# Patient Record
Sex: MALE | URBAN - METROPOLITAN AREA
[De-identification: names, ages, dates, MRNs, and addresses within clinical notes are randomized per-mention and may not be internally consistent; named-entity substitution may affect disease eponyms.]

---

## 2020-10-28 ENCOUNTER — OFFICE VISIT (OUTPATIENT)
Dept: URBAN - METROPOLITAN AREA CLINIC 7 | Facility: CLINIC | Age: 65
End: 2020-10-28
Payer: COMMERCIAL

## 2020-10-28 PROCEDURE — 67028 INJECTION EYE DRUG: CPT | Performed by: OPHTHALMOLOGY

## 2020-10-28 PROCEDURE — 92134 CPTRZ OPH DX IMG PST SGM RTA: CPT | Performed by: OPHTHALMOLOGY

## 2020-10-28 PROCEDURE — 92014 COMPRE OPH EXAM EST PT 1/>: CPT | Performed by: OPHTHALMOLOGY

## 2020-10-28 ASSESSMENT — INTRAOCULAR PRESSURE
OD: 13
OS: 17

## 2020-10-28 NOTE — IMPRESSION/PLAN
Impression: Central retinal vein occls, right eye, with macular edema: H34.8110.
-recurrence at 3 month interval

s/p Eylea OD: 7/29/20 OCT: 10/28/20 OD: tr IRF
OS: wnl Plan: Will proceed with Eylea OD today. Recurrence at extension to 12 weeks in past. Will try 3 months again. 

RTC 12 weeks reeval ou, oct ou, eylea OD

## 2021-01-20 ENCOUNTER — OFFICE VISIT (OUTPATIENT)
Dept: URBAN - METROPOLITAN AREA CLINIC 7 | Facility: CLINIC | Age: 66
End: 2021-01-20
Payer: COMMERCIAL

## 2021-01-20 PROCEDURE — 92134 CPTRZ OPH DX IMG PST SGM RTA: CPT | Performed by: OPHTHALMOLOGY

## 2021-01-20 PROCEDURE — 67028 INJECTION EYE DRUG: CPT | Performed by: OPHTHALMOLOGY

## 2021-01-20 PROCEDURE — 99214 OFFICE O/P EST MOD 30 MIN: CPT | Performed by: OPHTHALMOLOGY

## 2021-01-20 ASSESSMENT — INTRAOCULAR PRESSURE
OS: 17
OD: 10

## 2021-01-20 NOTE — IMPRESSION/PLAN
Impression: Age-related nuclear cataract, left eye: H25.12. Plan: Not yet visually significant.  Pt follows with Dr Carly Valle

## 2021-01-20 NOTE — IMPRESSION/PLAN
Impression: Central retinal vein occls, right eye, with macular edema: H34.8110.
-recurrence at 3 month interval

s/p Eylea OD: 10/28/20 OCT: 1/20/21 OD: tr IRF
OS: wnl Plan: The diagnosis, natural history, and prognosis of central retinal vein occlusion, were discussed. The associations with macular edema and neovascularization were also discussed. Currently, there is no iris or angle neovascularization identified on examination, and the IOP is within normal limits. Risks, benefits, and alternatives of treatment options including laser, steroids, Eylea, Lucentis and Avastin, as well as the opportunity to participate in a clinical trial were discussed at length. The patient understands that treatment may not improve vision, but should reduce the risk of further visual loss. Given stability of vision and exam, pt elects to proceed with Eylea OD today. Recurrence at extension to 12 weeks in past. Will try 3 months again. 

RTC 12 weeks reeval ou, oct ou, eylea OD

## 2021-04-21 ENCOUNTER — OFFICE VISIT (OUTPATIENT)
Dept: URBAN - METROPOLITAN AREA CLINIC 7 | Facility: CLINIC | Age: 66
End: 2021-04-21
Payer: COMMERCIAL

## 2021-04-21 DIAGNOSIS — H34.8110 CENTRAL RETINAL VEIN OCCLS, RIGHT EYE, WITH MACULAR EDEMA: Primary | ICD-10-CM

## 2021-04-21 DIAGNOSIS — H25.12 AGE-RELATED NUCLEAR CATARACT, LEFT EYE: ICD-10-CM

## 2021-04-21 PROCEDURE — 99214 OFFICE O/P EST MOD 30 MIN: CPT | Performed by: OPHTHALMOLOGY

## 2021-04-21 PROCEDURE — 67028 INJECTION EYE DRUG: CPT | Performed by: OPHTHALMOLOGY

## 2021-04-21 PROCEDURE — 92134 CPTRZ OPH DX IMG PST SGM RTA: CPT | Performed by: OPHTHALMOLOGY

## 2021-04-21 RX ORDER — TIMOLOL MALEATE 5 MG/ML
0.5 % SOLUTION/ DROPS OPHTHALMIC
Qty: 10 | Refills: 2 | Status: ACTIVE
Start: 2021-04-21

## 2021-04-21 ASSESSMENT — INTRAOCULAR PRESSURE
OS: 18
OD: 33

## 2021-04-21 NOTE — IMPRESSION/PLAN
Impression: Age-related nuclear cataract, left eye: H25.12. Plan: Not yet visually significant.  Pt follows with Dr Chandrakant Rod

## 2021-04-21 NOTE — IMPRESSION/PLAN
Impression: Central retinal vein occls, right eye, with macular edema: H34.8110.
-recurrence at 3 month interval

s/p Eylea OD: 01/20/21 OCT: 04/21/21 OD: tr IRF
OS: wnl Plan: The diagnosis, natural history, and prognosis of central retinal vein occlusion, were discussed. The associations with macular edema and neovascularization were also discussed. Currently, there is no iris or angle neovascularization identified on examination, and the IOP is within normal limits. Risks, benefits, and alternatives of treatment options including laser, steroids, Eylea, Lucentis and Avastin, as well as the opportunity to participate in a clinical trial were discussed at length. The patient understands that treatment may not improve vision, but should reduce the risk of further visual loss. Given stability of vision and exam, pt elects to proceed with Eylea OD today. Recurrence at extension to 12 weeks in past. Will try 3 months again. 

RTC 12 weeks reeval ou, oct ou, eylea OD

## 2021-07-14 ENCOUNTER — OFFICE VISIT (OUTPATIENT)
Dept: URBAN - METROPOLITAN AREA CLINIC 7 | Facility: CLINIC | Age: 66
End: 2021-07-14
Payer: COMMERCIAL

## 2021-07-14 DIAGNOSIS — H40.051 OCULAR HYPERTENSION, RIGHT EYE: ICD-10-CM

## 2021-07-14 PROCEDURE — 92134 CPTRZ OPH DX IMG PST SGM RTA: CPT | Performed by: OPHTHALMOLOGY

## 2021-07-14 PROCEDURE — 99213 OFFICE O/P EST LOW 20 MIN: CPT | Performed by: OPHTHALMOLOGY

## 2021-07-14 RX ORDER — LATANOPROST 50 UG/ML
0.005 % SOLUTION OPHTHALMIC
Qty: 5 | Refills: 3 | Status: INACTIVE
Start: 2021-07-14 | End: 2021-08-27

## 2021-07-14 ASSESSMENT — INTRAOCULAR PRESSURE
OD: 30
OS: 15

## 2021-07-14 NOTE — IMPRESSION/PLAN
Impression: Central retinal vein occls, right eye, with macular edema: H34.8110.
-recurrence at 3 month interval

s/p Eylea OD: 04/21/21 OCT: 07/14/21 OD: no ME
OS: wnl Plan: The diagnosis, natural history, and prognosis of central retinal vein occlusion, were discussed. The associations with macular edema and neovascularization were also discussed. Currently, there is no iris or angle neovascularization identified on examination, and the IOP is within normal limits. Risks, benefits, and alternatives of treatment options including laser, steroids, Eylea, Lucentis and Avastin, as well as the opportunity to participate in a clinical trial were discussed at length. The patient understands that treatment may not improve vision, but should reduce the risk of further visual loss. Given stability of vision and exam, pt elects to proceed with observe OD today. Recurrence at extension to 12 weeks in past. Will try 3 months again. 

RTC 4 weeks reeval ou, oct ou, eylea OD

## 2021-07-14 NOTE — IMPRESSION/PLAN
Impression: Age-related nuclear cataract, left eye: H25.12. Plan: Not yet visually significant.  Pt follows with Dr Amadeo Velazquez

## 2021-08-11 ENCOUNTER — OFFICE VISIT (OUTPATIENT)
Dept: URBAN - METROPOLITAN AREA CLINIC 7 | Facility: CLINIC | Age: 66
End: 2021-08-11
Payer: COMMERCIAL

## 2021-08-11 PROCEDURE — 99214 OFFICE O/P EST MOD 30 MIN: CPT | Performed by: OPHTHALMOLOGY

## 2021-08-11 PROCEDURE — 92134 CPTRZ OPH DX IMG PST SGM RTA: CPT | Performed by: OPHTHALMOLOGY

## 2021-08-11 ASSESSMENT — INTRAOCULAR PRESSURE
OS: 15
OD: 30

## 2021-08-11 NOTE — IMPRESSION/PLAN
Impression: Ocular hypertension, right eye: H40.051. Right. Plan: Pt having hard time tolerating drops. Will refer back to Dr Tavares Broussard for further management.

## 2021-08-11 NOTE — IMPRESSION/PLAN
Impression: Central retinal vein occls, right eye, with macular edema: H34.8110.
-previous recurrence at 3 month interval

s/p Eylea OD: 4/21/21 OCT: 08/11/21 OD: no ME
OS: wnl Plan: The diagnosis, natural history, and prognosis of central retinal vein occlusion, were discussed. The associations with macular edema and neovascularization were also discussed. Currently, there is no iris or angle neovascularization identified on examination, and the IOP is within normal limits. Risks, benefits, and alternatives of treatment options including laser, steroids, Eylea, Lucentis and Avastin, as well as the opportunity to participate in a clinical trial were discussed at length. The patient understands that treatment may not improve vision, but should reduce the risk of further visual loss. Given stability of vision and exam, pt elects to proceed with observe OD today. 

RTC 2-3 months reeval ou, oct ou, eylea OD

## 2021-10-13 ENCOUNTER — OFFICE VISIT (OUTPATIENT)
Dept: URBAN - METROPOLITAN AREA CLINIC 7 | Facility: CLINIC | Age: 66
End: 2021-10-13
Payer: COMMERCIAL

## 2021-10-13 PROCEDURE — 92134 CPTRZ OPH DX IMG PST SGM RTA: CPT | Performed by: OPHTHALMOLOGY

## 2021-10-13 PROCEDURE — 67028 INJECTION EYE DRUG: CPT | Performed by: OPHTHALMOLOGY

## 2021-10-13 ASSESSMENT — INTRAOCULAR PRESSURE
OS: 13
OD: 16

## 2021-10-13 NOTE — IMPRESSION/PLAN
"GENERAL SURGERY PROGRESS NOTE    CHIEF COMPLAINT ON ADMISSION:   Sepsis     SUBJECTIVE:   Reports he is feeling well, no nausea or pain. Having flatus and BMs, tolerating a full liquid diet. OBJECTIVE:   Blood pressure 125/62, pulse 69, temperature 98.3 Â°F (36.8 Â°C), temperature source Oral, resp. rate 18, height 5' 8"" (1.727 m), weight 61 kg, SpO2 98 %. RECENT WEIGHTS:  Weight    08/22/19 0700 08/23/19 0600 08/24/19 0600 08/25/19 0600   Weight: 61.8 kg 61.3 kg 65.2 kg 61 kg     General: Sitting up in bed, looks well   Abdomen:  Soft, non distended, nontender, active bowel sounds, cholecystostomy drain with bilious drainage, pain pump palpable   Neuro:  Grossly normal.  Intake/Output:    Intake/Output Summary (Last 24 hours) at 8/25/2019 1200  Last data filed at 8/25/2019 0600  Gross per 24 hour   Intake 240 ml   Output 225 ml   Net 15 ml        Laboratory Results:  Recent Labs   Lab 08/25/19  0545 08/24/19  0347 08/23/19  0335   WBC 6.6 9.4 8.7   RBC 3.96* 3.21* 3.09*   HCT 37.3* 30.2* 29.2*   HGB 11.9* 9.8* 9.3*    284 309     Recent Labs   Lab 08/25/19  0545 08/24/19  0347 08/23/19  0335  08/20/19  1539 08/20/19  1120 08/20/19  0355   SODIUM 141 141 142   < >  --   --  140   POTASSIUM 3.4 3.5 3.4   < >  --   --  3.8   CHLORIDE 107 107 107   < >  --   --  107   CO2 27 29 27   < >  --   --  26   GLUCOSE 105* 107* 116*   < >  --   --  131*   BUN 16 17 19   < >  --   --  9   CREATININE 0.51* 0.55* 0.47*   < >  --   --  0.74   CALCIUM 8.1* 7.5* 7.9*   < >  --   --  8.6   CHAPIS  --   --  1.17  --   --   --  1.22   ALBUMIN 2.0* 2.0* 2.1*   < >  --   --  2.5*   MG 1.7 1.6* 1.7   < >  --   --  1.7   BILIRUBIN 0.3 0.6 0.6   < >  --   --  0.5   ALKPT 117 137* 151*   < >  --   --  103   LACTA  --   --   --   --  1.3 1.0  --    AST 33 61* 91*   < >  --   --  15   GPT 98* 121* 112*   < >  --   --  16   BCRAT 31* 31* 40*   < >  --   --  12    < > = values in this interval not displayed.      ASSESSMENT:  -   E Coli " Impression: Central retinal vein occls, right eye, with macular edema: H34.8110.
-previous recurrence at 3 month interval

s/p Eylea OD: 4/21/21 OCT: 10/13/21 OD: tr ME
OS: wnl Plan: The diagnosis, natural history, and prognosis of central retinal vein occlusion, were discussed. The associations with macular edema and neovascularization were also discussed. Currently, there is no iris or angle neovascularization identified on examination, and the IOP is within normal limits. Risks, benefits, and alternatives of treatment options including laser, steroids, Eylea, Lucentis and Avastin, as well as the opportunity to participate in a clinical trial were discussed at length. The patient understands that treatment may not improve vision, but should reduce the risk of further visual loss. Given worsening of vision and exam, pt elects to proceed with Eylea OD today. 

RTC 3 months reeval ou, oct ou, eylea OD Sepsis 8/11/2019  -   LUQ/perisplenic abscess, s/p IR drain 8/11  -   Cholecystitis s/p cholecystostomy tube 8/4   -   UTI 8/4  -  Portal vein thrombosis. GI bleed while on heparin gtt, s/p EGD -> distal esophageal bleed.  S/P angio/embolization of gastric artery & distal esophageal branch 8/12  -   Alcohol - induced pancreatitis, pancreatic pseudocysts   -   Hx DVT, PE  -   Chronic pain with intrathecal pump      PLAN:   -   Diet: Fulls, advance to soft, low residue diet   -   Nutrition: TPN, dc, spoke to pharmacy   -   Delaney tube management per IR      Mandy Smith, [address] [place] [address]

## 2021-10-13 NOTE — IMPRESSION/PLAN
Impression: Ocular hypertension, right eye: H40.051. Right. Plan: Pt followed by Dr Carmella Hill. On topicals. Doing better.

## 2022-01-12 ENCOUNTER — OFFICE VISIT (OUTPATIENT)
Dept: URBAN - METROPOLITAN AREA CLINIC 7 | Facility: CLINIC | Age: 67
End: 2022-01-12
Payer: COMMERCIAL

## 2022-01-12 PROCEDURE — 99214 OFFICE O/P EST MOD 30 MIN: CPT | Performed by: OPHTHALMOLOGY

## 2022-01-12 PROCEDURE — 92134 CPTRZ OPH DX IMG PST SGM RTA: CPT | Performed by: OPHTHALMOLOGY

## 2022-01-12 PROCEDURE — 67028 INJECTION EYE DRUG: CPT | Performed by: OPHTHALMOLOGY

## 2022-01-12 ASSESSMENT — INTRAOCULAR PRESSURE
OD: 11
OS: 11

## 2022-01-12 NOTE — IMPRESSION/PLAN
Impression: Ocular hypertension, right eye: H40.051. Right. Plan: Pt followed by Dr Lillian Solorio. On topicals. Doing better.

## 2022-01-12 NOTE — IMPRESSION/PLAN
Impression: Central retinal vein occls, right eye, with macular edema: H34.8110.
-previous recurrence at 3 month interval
-s/p Eylea OD: 10/13/21 OCT: 01/2/22 OD: tr ME
OS: wnl Plan: The diagnosis, natural history, and prognosis of central retinal vein occlusion, were discussed. The associations with macular edema and neovascularization were also discussed. Currently, there is no iris or angle neovascularization identified on examination, and the IOP is within normal limits. Risks, benefits, and alternatives of treatment options including laser, steroids, Eylea, Lucentis and Avastin, as well as the opportunity to participate in a clinical trial were discussed at length. The patient understands that treatment may not improve vision, but should reduce the risk of further visual loss. Given improving of vision and exam, pt elects to proceed with Eylea OD today. 

RTC 3 months reeval ou, oct ou, eylea OD

## 2022-04-13 ENCOUNTER — OFFICE VISIT (OUTPATIENT)
Dept: URBAN - METROPOLITAN AREA CLINIC 7 | Facility: CLINIC | Age: 67
End: 2022-04-13
Payer: COMMERCIAL

## 2022-04-13 DIAGNOSIS — H34.8110 CENTRAL RETINAL VEIN OCCLS, RIGHT EYE, WITH MACULAR EDEMA: Primary | ICD-10-CM

## 2022-04-13 DIAGNOSIS — H40.051 OCULAR HYPERTENSION, RIGHT EYE: ICD-10-CM

## 2022-04-13 PROCEDURE — 67028 INJECTION EYE DRUG: CPT | Performed by: OPHTHALMOLOGY

## 2022-04-13 PROCEDURE — 92134 CPTRZ OPH DX IMG PST SGM RTA: CPT | Performed by: OPHTHALMOLOGY

## 2022-04-13 PROCEDURE — 99214 OFFICE O/P EST MOD 30 MIN: CPT | Performed by: OPHTHALMOLOGY

## 2022-04-13 NOTE — IMPRESSION/PLAN
Impression: Central retinal vein occls, right eye, with macular edema: H34.8110.
-previous recurrence at 3 month interval
-s/p Eylea 01/12/22 OCT: 04/13/22 OD: tr ME
OS: wnl Plan: The diagnosis, natural history, and prognosis of central retinal vein occlusion, were discussed. The associations with macular edema and neovascularization were also discussed. Currently, there is no iris or angle neovascularization identified on examination, and the IOP is within normal limits. Risks, benefits, and alternatives of treatment options including laser, steroids, Eylea, Lucentis and Avastin, as well as the opportunity to participate in a clinical trial were discussed at length. The patient understands that treatment may not improve vision, but should reduce the risk of further visual loss. Given improving of vision and exam, pt elects to proceed with Eylea OD today. Brimonidine given after injection RTC 3 months reeval ou, oct ou, eylea OD

## 2022-04-13 NOTE — IMPRESSION/PLAN
Impression: Ocular hypertension, right eye: H40.051. Right. Plan: Pt followed by Dr Nadege Green. On topicals. Doing better.

## 2022-07-20 ENCOUNTER — OFFICE VISIT (OUTPATIENT)
Dept: URBAN - METROPOLITAN AREA CLINIC 7 | Facility: CLINIC | Age: 67
End: 2022-07-20
Payer: COMMERCIAL

## 2022-07-20 DIAGNOSIS — H40.051 OCULAR HYPERTENSION, RIGHT EYE: ICD-10-CM

## 2022-07-20 DIAGNOSIS — H34.8110 CENTRAL RETINAL VEIN OCCLS, RIGHT EYE, WITH MACULAR EDEMA: Primary | ICD-10-CM

## 2022-07-20 PROCEDURE — 92134 CPTRZ OPH DX IMG PST SGM RTA: CPT | Performed by: OPHTHALMOLOGY

## 2022-07-20 PROCEDURE — 67028 INJECTION EYE DRUG: CPT | Performed by: OPHTHALMOLOGY

## 2022-07-20 ASSESSMENT — INTRAOCULAR PRESSURE
OS: 19
OD: 18

## 2022-07-20 NOTE — IMPRESSION/PLAN
Impression: Ocular hypertension, right eye: H40.051. Right. Plan: Pt followed by Dr Bean Baeza. On topicals. Doing better.

## 2022-07-20 NOTE — IMPRESSION/PLAN
Impression: Central retinal vein occls, right eye, with macular edema: H34.8110.
-previous recurrence at 3 month interval
-s/p Eylea 04/13/22 OCT: 07/20/22 OD: tr ME
OS: wnl Plan: The diagnosis, natural history, and prognosis of central retinal vein occlusion, were discussed. The associations with macular edema and neovascularization were also discussed. Currently, there is no iris or angle neovascularization identified on examination, and the IOP is within normal limits. Risks, benefits, and alternatives of treatment options including laser, steroids, Eylea, Lucentis and Avastin, as well as the opportunity to participate in a clinical trial were discussed at length. The patient understands that treatment may not improve vision, but should reduce the risk of further visual loss. Given improving of vision and exam, pt elects to proceed with Eylea OD today. Brimonidine given after injection RTC 3 months reeval ou, oct ou, eylea OD

## 2022-10-26 ENCOUNTER — OFFICE VISIT (OUTPATIENT)
Dept: URBAN - METROPOLITAN AREA CLINIC 7 | Facility: CLINIC | Age: 67
End: 2022-10-26
Payer: COMMERCIAL

## 2022-10-26 DIAGNOSIS — H40.051 OCULAR HYPERTENSION, RIGHT EYE: ICD-10-CM

## 2022-10-26 DIAGNOSIS — H34.8110 CENTRAL RETINAL VEIN OCCLS, RIGHT EYE, WITH MACULAR EDEMA: Primary | ICD-10-CM

## 2022-10-26 PROCEDURE — 92134 CPTRZ OPH DX IMG PST SGM RTA: CPT | Performed by: OPHTHALMOLOGY

## 2022-10-26 PROCEDURE — 67028 INJECTION EYE DRUG: CPT | Performed by: OPHTHALMOLOGY

## 2022-10-26 PROCEDURE — 99214 OFFICE O/P EST MOD 30 MIN: CPT | Performed by: OPHTHALMOLOGY

## 2022-10-26 ASSESSMENT — INTRAOCULAR PRESSURE
OD: 20
OS: 19

## 2022-10-26 NOTE — IMPRESSION/PLAN
Impression: Central retinal vein occls, right eye, with macular edema: H34.8110.
-previous recurrence at 3 month interval
-s/p Eylea 07/20/22 OCT: 10/26/22 OD: tr ME
OS: wnl Plan: The diagnosis, natural history, and prognosis of central retinal vein occlusion, were discussed. The associations with macular edema and neovascularization were also discussed. Currently, there is no iris or angle neovascularization identified on examination, and the IOP is within normal limits. Risks, benefits, and alternatives of treatment options including laser, steroids, Eylea, Lucentis and Avastin, as well as the opportunity to participate in a clinical trial were discussed at length. The patient understands that treatment may not improve vision, but should reduce the risk of further visual loss. Given improving of vision and exam, pt elects to proceed with Eylea OD today. Brimonidine given after injection RTC 3 months reeval ou, oct ou, eylea OD

## 2022-10-26 NOTE — IMPRESSION/PLAN
Impression: Ocular hypertension, right eye: H40.051. Right. Plan: Pt followed by Dr Yvon Villa. On topicals. Doing better.

## 2023-01-25 ENCOUNTER — OFFICE VISIT (OUTPATIENT)
Dept: URBAN - METROPOLITAN AREA CLINIC 7 | Facility: CLINIC | Age: 68
End: 2023-01-25
Payer: COMMERCIAL

## 2023-01-25 DIAGNOSIS — H40.051 OCULAR HYPERTENSION, RIGHT EYE: ICD-10-CM

## 2023-01-25 DIAGNOSIS — H34.8110 CENTRAL RETINAL VEIN OCCLUSION, RIGHT EYE, WITH MACULAR EDEMA: Primary | ICD-10-CM

## 2023-01-25 PROCEDURE — 92134 CPTRZ OPH DX IMG PST SGM RTA: CPT | Performed by: OPHTHALMOLOGY

## 2023-01-25 PROCEDURE — 99214 OFFICE O/P EST MOD 30 MIN: CPT | Performed by: OPHTHALMOLOGY

## 2023-01-25 PROCEDURE — 67028 INJECTION EYE DRUG: CPT | Performed by: OPHTHALMOLOGY

## 2023-01-25 ASSESSMENT — INTRAOCULAR PRESSURE
OS: 15
OD: 25

## 2023-01-25 NOTE — IMPRESSION/PLAN
Impression: Ocular hypertension, right eye: H40.051. Right. Plan: Pt followed by Dr Shanta Cotter. On topicals. Doing better.

## 2023-01-25 NOTE — IMPRESSION/PLAN
Impression: Central retinal vein occls, right eye, with macular edema: H34.8110.
-previous recurrence at 3 month interval
-s/p Eylea 10/26/2022 OCT: 01/25/2023 OD: tr ME
OS: wnl Plan: The diagnosis, natural history, and prognosis of central retinal vein occlusion, were discussed. The associations with macular edema and neovascularization were also discussed. Currently, there is no iris or angle neovascularization identified on examination, and the IOP is within normal limits. Risks, benefits, and alternatives of treatment options including laser, steroids, Eylea, Lucentis and Avastin, as well as the opportunity to participate in a clinical trial were discussed at length. The patient understands that treatment may not improve vision, but should reduce the risk of further visual loss. Given improving of vision and exam, pt elects to proceed with Eylea OD today. Brimonidine given after injection RTC 3 months reeval ou, oct ou, eylea OD

## 2023-04-26 ENCOUNTER — OFFICE VISIT (OUTPATIENT)
Dept: URBAN - METROPOLITAN AREA CLINIC 7 | Facility: CLINIC | Age: 68
End: 2023-04-26
Payer: COMMERCIAL

## 2023-04-26 DIAGNOSIS — H34.8110 CENTRAL RETINAL VEIN OCCLS, RIGHT EYE, WITH MACULAR EDEMA: Primary | ICD-10-CM

## 2023-04-26 DIAGNOSIS — H40.051 OCULAR HYPERTENSION, RIGHT EYE: ICD-10-CM

## 2023-04-26 PROCEDURE — 99214 OFFICE O/P EST MOD 30 MIN: CPT | Performed by: OPHTHALMOLOGY

## 2023-04-26 PROCEDURE — 67028 INJECTION EYE DRUG: CPT | Performed by: OPHTHALMOLOGY

## 2023-04-26 PROCEDURE — 92134 CPTRZ OPH DX IMG PST SGM RTA: CPT | Performed by: OPHTHALMOLOGY

## 2023-04-26 ASSESSMENT — INTRAOCULAR PRESSURE
OD: 28
OS: 17

## 2023-04-26 NOTE — IMPRESSION/PLAN
Impression: Ocular hypertension, right eye: H40.051. Right. Plan: Pt followed by Dr Sonny Gonzales. On topicals. IOP high today. Patient did not take drops today, stress compliance. present

## 2023-04-26 NOTE — IMPRESSION/PLAN
Impression: Central retinal vein occls, right eye, with macular edema: H34.8110.
-previous recurrence at 3 month interval
-s/p Eylea 01/25/23 OCT: 04/26/2023 OD: tr ME
OS: wnl Plan: The diagnosis, natural history, and prognosis of central retinal vein occlusion, were discussed. The associations with macular edema and neovascularization were also discussed. Currently, there is no iris or angle neovascularization identified on examination, and the IOP is within normal limits. Risks, benefits, and alternatives of treatment options including laser, steroids, Eylea, Lucentis and Avastin, as well as the opportunity to participate in a clinical trial were discussed at length. The patient understands that treatment may not improve vision, but should reduce the risk of further visual loss. Given improving of vision and exam, pt elects to proceed with Eylea OD today. Brimonidine given after injection RTC 3 months DFE/OCT OU re-eval Eylea

## 2023-07-19 ENCOUNTER — OFFICE VISIT (OUTPATIENT)
Dept: URBAN - METROPOLITAN AREA CLINIC 7 | Facility: CLINIC | Age: 68
End: 2023-07-19
Payer: COMMERCIAL

## 2023-07-19 DIAGNOSIS — H34.8110 CENTRAL RETINAL VEIN OCCLS, RIGHT EYE, WITH MACULAR EDEMA: Primary | ICD-10-CM

## 2023-07-19 DIAGNOSIS — H40.051 OCULAR HYPERTENSION, RIGHT EYE: ICD-10-CM

## 2023-07-19 PROCEDURE — 92134 CPTRZ OPH DX IMG PST SGM RTA: CPT | Performed by: OPHTHALMOLOGY

## 2023-07-19 PROCEDURE — 99214 OFFICE O/P EST MOD 30 MIN: CPT | Performed by: OPHTHALMOLOGY

## 2023-07-19 PROCEDURE — 67028 INJECTION EYE DRUG: CPT | Performed by: OPHTHALMOLOGY

## 2023-07-19 ASSESSMENT — INTRAOCULAR PRESSURE
OD: 11
OS: 17

## 2023-07-19 NOTE — IMPRESSION/PLAN
Impression: Ocular hypertension, right eye: H40.051. Right. Plan: Pt followed by Dr Lillian Solorio. On topicals. IOP better today.  Stressed compliance

## 2023-07-19 NOTE — IMPRESSION/PLAN
Impression: Central retinal vein occls, right eye, with macular edema: H34.8110.
-previous recurrence at 3 month interval
-s/p Eylea 4/26/23 OCT: 7/19/23 OD: tr ME
OS: wnl Plan: The diagnosis, natural history, and prognosis of central retinal vein occlusion, were discussed. The associations with macular edema and neovascularization were also discussed. Currently, there is no iris or angle neovascularization identified on examination, and the IOP is within normal limits. Risks, benefits, and alternatives of treatment options including laser, steroids, Eylea, Lucentis and Avastin, as well as the opportunity to participate in a clinical trial were discussed at length. The patient understands that treatment may not improve vision, but should reduce the risk of further visual loss. Given improving of vision and exam, pt elects to proceed with Eylea OD today. Brimonidine given after injection RTC 3 months DFE/OCT OU re-eval Eylea

## 2023-10-04 ENCOUNTER — OFFICE VISIT (OUTPATIENT)
Dept: URBAN - METROPOLITAN AREA CLINIC 7 | Facility: CLINIC | Age: 68
End: 2023-10-04
Payer: COMMERCIAL

## 2023-10-04 DIAGNOSIS — H40.051 OCULAR HYPERTENSION, RIGHT EYE: ICD-10-CM

## 2023-10-04 DIAGNOSIS — H34.8110 CENTRAL RETINAL VEIN OCCLS, RIGHT EYE, WITH MACULAR EDEMA: Primary | ICD-10-CM

## 2023-10-04 PROCEDURE — 67028 INJECTION EYE DRUG: CPT | Performed by: OPHTHALMOLOGY

## 2023-10-04 PROCEDURE — 92134 CPTRZ OPH DX IMG PST SGM RTA: CPT | Performed by: OPHTHALMOLOGY

## 2023-10-04 PROCEDURE — 99214 OFFICE O/P EST MOD 30 MIN: CPT | Performed by: OPHTHALMOLOGY

## 2023-10-04 ASSESSMENT — INTRAOCULAR PRESSURE
OS: 15
OD: 17

## 2024-01-03 ENCOUNTER — OFFICE VISIT (OUTPATIENT)
Dept: URBAN - METROPOLITAN AREA CLINIC 7 | Facility: CLINIC | Age: 69
End: 2024-01-03
Payer: COMMERCIAL

## 2024-01-03 DIAGNOSIS — H34.8110 CENTRAL RETINAL VEIN OCCLS, RIGHT EYE, WITH MACULAR EDEMA: Primary | ICD-10-CM

## 2024-01-03 DIAGNOSIS — H40.051 OCULAR HYPERTENSION, RIGHT EYE: ICD-10-CM

## 2024-01-03 PROCEDURE — 92134 CPTRZ OPH DX IMG PST SGM RTA: CPT | Performed by: OPHTHALMOLOGY

## 2024-01-03 PROCEDURE — 99214 OFFICE O/P EST MOD 30 MIN: CPT | Performed by: OPHTHALMOLOGY

## 2024-01-03 PROCEDURE — 67028 INJECTION EYE DRUG: CPT | Performed by: OPHTHALMOLOGY

## 2024-01-03 ASSESSMENT — INTRAOCULAR PRESSURE
OS: 13
OD: 21

## 2024-04-25 ENCOUNTER — OFFICE VISIT (OUTPATIENT)
Dept: URBAN - METROPOLITAN AREA CLINIC 7 | Facility: CLINIC | Age: 69
End: 2024-04-25
Payer: COMMERCIAL

## 2024-04-25 DIAGNOSIS — H34.8110 CENTRAL RETINAL VEIN OCCLS, RIGHT EYE, WITH MACULAR EDEMA: Primary | ICD-10-CM

## 2024-04-25 DIAGNOSIS — Z96.1 PRESENCE OF INTRAOCULAR LENS: ICD-10-CM

## 2024-04-25 DIAGNOSIS — H40.051 OCULAR HYPERTENSION, RIGHT EYE: ICD-10-CM

## 2024-04-25 DIAGNOSIS — H43.811 VITREOUS DEGENERATION, RIGHT EYE: ICD-10-CM

## 2024-04-25 DIAGNOSIS — H25.12 AGE-RELATED NUCLEAR CATARACT, LEFT EYE: ICD-10-CM

## 2024-04-25 PROCEDURE — 92134 CPTRZ OPH DX IMG PST SGM RTA: CPT | Performed by: STUDENT IN AN ORGANIZED HEALTH CARE EDUCATION/TRAINING PROGRAM

## 2024-04-25 PROCEDURE — 99214 OFFICE O/P EST MOD 30 MIN: CPT | Performed by: STUDENT IN AN ORGANIZED HEALTH CARE EDUCATION/TRAINING PROGRAM

## 2024-04-25 ASSESSMENT — INTRAOCULAR PRESSURE
OD: 11
OS: 11

## 2024-06-12 ENCOUNTER — OFFICE VISIT (OUTPATIENT)
Dept: URBAN - METROPOLITAN AREA CLINIC 7 | Facility: CLINIC | Age: 69
End: 2024-06-12
Payer: COMMERCIAL

## 2024-06-12 DIAGNOSIS — H34.8110 CENTRAL RETINAL VEIN OCCLS, RIGHT EYE, WITH MACULAR EDEMA: Primary | ICD-10-CM

## 2024-06-12 DIAGNOSIS — H43.811 VITREOUS DEGENERATION, RIGHT EYE: ICD-10-CM

## 2024-06-12 DIAGNOSIS — H25.12 AGE-RELATED NUCLEAR CATARACT, LEFT EYE: ICD-10-CM

## 2024-06-12 DIAGNOSIS — H40.051 OCULAR HYPERTENSION, RIGHT EYE: ICD-10-CM

## 2024-06-12 PROCEDURE — 92134 CPTRZ OPH DX IMG PST SGM RTA: CPT | Performed by: STUDENT IN AN ORGANIZED HEALTH CARE EDUCATION/TRAINING PROGRAM

## 2024-06-12 PROCEDURE — 99214 OFFICE O/P EST MOD 30 MIN: CPT | Performed by: STUDENT IN AN ORGANIZED HEALTH CARE EDUCATION/TRAINING PROGRAM

## 2024-06-12 RX ORDER — BRIMONIDINE TARTRATE 1.5 MG/ML
0.15 % SOLUTION/ DROPS OPHTHALMIC
Qty: 10 | Refills: 3 | Status: ACTIVE
Start: 2024-06-12

## 2024-06-12 ASSESSMENT — INTRAOCULAR PRESSURE
OS: 16
OD: 31

## 2024-08-21 ENCOUNTER — OFFICE VISIT (OUTPATIENT)
Dept: URBAN - METROPOLITAN AREA CLINIC 7 | Facility: CLINIC | Age: 69
End: 2024-08-21
Payer: COMMERCIAL

## 2024-08-21 DIAGNOSIS — H34.8110 CENTRAL RETINAL VEIN OCCLS, RIGHT EYE, WITH MACULAR EDEMA: Primary | ICD-10-CM

## 2024-08-21 DIAGNOSIS — H40.051 OCULAR HYPERTENSION, RIGHT EYE: ICD-10-CM

## 2024-08-21 DIAGNOSIS — H43.811 VITREOUS DEGENERATION, RIGHT EYE: ICD-10-CM

## 2024-08-21 DIAGNOSIS — H25.12 AGE-RELATED NUCLEAR CATARACT, LEFT EYE: ICD-10-CM

## 2024-08-21 PROCEDURE — 92134 CPTRZ OPH DX IMG PST SGM RTA: CPT | Performed by: STUDENT IN AN ORGANIZED HEALTH CARE EDUCATION/TRAINING PROGRAM

## 2024-08-21 PROCEDURE — 99214 OFFICE O/P EST MOD 30 MIN: CPT | Performed by: STUDENT IN AN ORGANIZED HEALTH CARE EDUCATION/TRAINING PROGRAM

## 2024-08-21 ASSESSMENT — INTRAOCULAR PRESSURE
OD: 21
OS: 15

## 2025-02-19 ENCOUNTER — OFFICE VISIT (OUTPATIENT)
Dept: URBAN - METROPOLITAN AREA CLINIC 7 | Facility: CLINIC | Age: 70
End: 2025-02-19
Payer: COMMERCIAL

## 2025-02-19 DIAGNOSIS — H40.051 OCULAR HYPERTENSION, RIGHT EYE: ICD-10-CM

## 2025-02-19 DIAGNOSIS — H34.8110 CENTRAL RETINAL VEIN OCCLS, RIGHT EYE, WITH MACULAR EDEMA: Primary | ICD-10-CM

## 2025-02-19 DIAGNOSIS — H25.12 AGE-RELATED NUCLEAR CATARACT, LEFT EYE: ICD-10-CM

## 2025-02-19 DIAGNOSIS — H43.811 VITREOUS DEGENERATION, RIGHT EYE: ICD-10-CM

## 2025-02-19 PROCEDURE — 99214 OFFICE O/P EST MOD 30 MIN: CPT | Performed by: STUDENT IN AN ORGANIZED HEALTH CARE EDUCATION/TRAINING PROGRAM

## 2025-02-19 PROCEDURE — 92134 CPTRZ OPH DX IMG PST SGM RTA: CPT | Performed by: STUDENT IN AN ORGANIZED HEALTH CARE EDUCATION/TRAINING PROGRAM

## 2025-08-20 ENCOUNTER — OFFICE VISIT (OUTPATIENT)
Dept: URBAN - METROPOLITAN AREA CLINIC 7 | Facility: CLINIC | Age: 70
End: 2025-08-20
Payer: MEDICARE

## 2025-08-20 DIAGNOSIS — H40.051 OCULAR HYPERTENSION, RIGHT EYE: ICD-10-CM

## 2025-08-20 DIAGNOSIS — H34.8110 CENTRAL RETINAL VEIN OCCLUSION, RIGHT EYE, WITH MACULAR EDEMA: Primary | ICD-10-CM

## 2025-08-20 DIAGNOSIS — H25.12 AGE-RELATED NUCLEAR CATARACT, LEFT EYE: ICD-10-CM

## 2025-08-20 DIAGNOSIS — H43.811 VITREOUS DEGENERATION, RIGHT EYE: ICD-10-CM

## 2025-08-20 PROCEDURE — 99214 OFFICE O/P EST MOD 30 MIN: CPT | Performed by: STUDENT IN AN ORGANIZED HEALTH CARE EDUCATION/TRAINING PROGRAM

## 2025-08-20 PROCEDURE — 92134 CPTRZ OPH DX IMG PST SGM RTA: CPT | Performed by: STUDENT IN AN ORGANIZED HEALTH CARE EDUCATION/TRAINING PROGRAM

## 2025-08-20 ASSESSMENT — INTRAOCULAR PRESSURE
OS: 15
OD: 22